# Patient Record
Sex: FEMALE | Race: WHITE | NOT HISPANIC OR LATINO | Employment: FULL TIME | URBAN - METROPOLITAN AREA
[De-identification: names, ages, dates, MRNs, and addresses within clinical notes are randomized per-mention and may not be internally consistent; named-entity substitution may affect disease eponyms.]

---

## 2023-07-31 ENCOUNTER — APPOINTMENT (OUTPATIENT)
Dept: PRIMARY CARE | Facility: CLINIC | Age: 63
End: 2023-07-31
Payer: COMMERCIAL

## 2023-07-31 ENCOUNTER — OFFICE VISIT (OUTPATIENT)
Dept: PRIMARY CARE | Facility: CLINIC | Age: 63
End: 2023-07-31
Payer: COMMERCIAL

## 2023-07-31 VITALS
TEMPERATURE: 97.2 F | HEIGHT: 62 IN | BODY MASS INDEX: 23.92 KG/M2 | DIASTOLIC BLOOD PRESSURE: 74 MMHG | OXYGEN SATURATION: 98 % | WEIGHT: 130 LBS | SYSTOLIC BLOOD PRESSURE: 122 MMHG | HEART RATE: 58 BPM

## 2023-07-31 DIAGNOSIS — Z23 ENCOUNTER FOR IMMUNIZATION: ICD-10-CM

## 2023-07-31 DIAGNOSIS — M85.88 OSTEOPENIA OF LUMBAR SPINE: ICD-10-CM

## 2023-07-31 DIAGNOSIS — Z12.39 BREAST SCREENING: ICD-10-CM

## 2023-07-31 DIAGNOSIS — E78.00 ELEVATED LDL CHOLESTEROL LEVEL: ICD-10-CM

## 2023-07-31 DIAGNOSIS — Z00.00 ROUTINE GENERAL MEDICAL EXAMINATION AT A HEALTH CARE FACILITY: Primary | ICD-10-CM

## 2023-07-31 DIAGNOSIS — R73.09 ABNORMAL GLUCOSE: ICD-10-CM

## 2023-07-31 PROBLEM — Z11.52 ENCOUNTER FOR SCREENING LABORATORY TESTING FOR COVID-19 VIRUS: Status: RESOLVED | Noted: 2023-07-31 | Resolved: 2023-07-31

## 2023-07-31 PROBLEM — R30.0 DYSURIA: Status: RESOLVED | Noted: 2023-07-31 | Resolved: 2023-07-31

## 2023-07-31 LAB
ALANINE AMINOTRANSFERASE (SGPT) (U/L) IN SER/PLAS: 12 U/L (ref 7–45)
ALBUMIN (G/DL) IN SER/PLAS: 4.5 G/DL (ref 3.4–5)
ALKALINE PHOSPHATASE (U/L) IN SER/PLAS: 72 U/L (ref 33–136)
ANION GAP IN SER/PLAS: 13 MMOL/L (ref 10–20)
ASPARTATE AMINOTRANSFERASE (SGOT) (U/L) IN SER/PLAS: 16 U/L (ref 9–39)
BILIRUBIN TOTAL (MG/DL) IN SER/PLAS: 0.7 MG/DL (ref 0–1.2)
CALCIDIOL (25 OH VITAMIN D3) (NG/ML) IN SER/PLAS: 31 NG/ML
CALCIUM (MG/DL) IN SER/PLAS: 9.3 MG/DL (ref 8.6–10.6)
CARBON DIOXIDE, TOTAL (MMOL/L) IN SER/PLAS: 24 MMOL/L (ref 21–32)
CHLORIDE (MMOL/L) IN SER/PLAS: 104 MMOL/L (ref 98–107)
CHOLESTEROL (MG/DL) IN SER/PLAS: 259 MG/DL (ref 0–199)
CHOLESTEROL IN HDL (MG/DL) IN SER/PLAS: 91.6 MG/DL
CHOLESTEROL/HDL RATIO: 2.8
CREATININE (MG/DL) IN SER/PLAS: 1.04 MG/DL (ref 0.5–1.05)
GFR FEMALE: 60 ML/MIN/1.73M2
GLUCOSE (MG/DL) IN SER/PLAS: 102 MG/DL (ref 74–99)
LDL: 155 MG/DL (ref 0–99)
POTASSIUM (MMOL/L) IN SER/PLAS: 4.4 MMOL/L (ref 3.5–5.3)
PROTEIN TOTAL: 7.2 G/DL (ref 6.4–8.2)
SODIUM (MMOL/L) IN SER/PLAS: 137 MMOL/L (ref 136–145)
TRIGLYCERIDE (MG/DL) IN SER/PLAS: 60 MG/DL (ref 0–149)
UREA NITROGEN (MG/DL) IN SER/PLAS: 21 MG/DL (ref 6–23)
VLDL: 12 MG/DL (ref 0–40)

## 2023-07-31 PROCEDURE — 90471 IMMUNIZATION ADMIN: CPT | Performed by: NURSE PRACTITIONER

## 2023-07-31 PROCEDURE — 99396 PREV VISIT EST AGE 40-64: CPT | Performed by: NURSE PRACTITIONER

## 2023-07-31 PROCEDURE — 90750 HZV VACC RECOMBINANT IM: CPT | Performed by: NURSE PRACTITIONER

## 2023-07-31 PROCEDURE — 80053 COMPREHEN METABOLIC PANEL: CPT

## 2023-07-31 PROCEDURE — 82306 VITAMIN D 25 HYDROXY: CPT

## 2023-07-31 PROCEDURE — 80061 LIPID PANEL: CPT

## 2023-07-31 PROCEDURE — 1036F TOBACCO NON-USER: CPT | Performed by: NURSE PRACTITIONER

## 2023-07-31 PROCEDURE — 3008F BODY MASS INDEX DOCD: CPT | Performed by: NURSE PRACTITIONER

## 2023-07-31 ASSESSMENT — PAIN SCALES - GENERAL: PAINLEVEL: 0-NO PAIN

## 2023-07-31 ASSESSMENT — ENCOUNTER SYMPTOMS
FATIGUE: 0
BLOOD IN STOOL: 0
ARTHRALGIAS: 0
PALPITATIONS: 0
HEADACHES: 0
CONSTIPATION: 0
VOMITING: 0
SHORTNESS OF BREATH: 0
NAUSEA: 0
FREQUENCY: 0
COUGH: 0
ABDOMINAL PAIN: 0
BACK PAIN: 0
APPETITE CHANGE: 0
MYALGIAS: 0
UNEXPECTED WEIGHT CHANGE: 0
DIARRHEA: 0
DYSURIA: 0
NERVOUS/ANXIOUS: 0

## 2023-07-31 ASSESSMENT — PATIENT HEALTH QUESTIONNAIRE - PHQ9
SUM OF ALL RESPONSES TO PHQ9 QUESTIONS 1 AND 2: 0
1. LITTLE INTEREST OR PLEASURE IN DOING THINGS: NOT AT ALL
2. FEELING DOWN, DEPRESSED OR HOPELESS: NOT AT ALL

## 2023-07-31 NOTE — PROGRESS NOTES
Subjective   Gillian Bhatt is a 63 y.o. female and is here for a comprehensive physical exam. The patient reports no problems.    Has previously been under GYN (Dr. Shirlene Blackburn), has since last visit closed practice.      History:  LMP: No LMP recorded.  Menopause at  years  Last pap date: ?3/2022 with Dr. Shirlene Blackburn  Abnormal pap? no  : 4  Para: 2  Mammogram: 3/24/2022 normal  DEXA: 3/24/2022 Osteopenia    Colonoscopy: 2022 - sessile serrated adenoma - Repeat in one year  Scheduled for October     Tdap 2017  Shingrix never    Do you have pain that bothers you in your daily life? no    Review of Systems   Constitutional:  Negative for appetite change, fatigue and unexpected weight change.   HENT:  Positive for hearing loss (hearing loss on occasion with soft noises). Negative for congestion and ear pain.    Eyes:  Negative for visual disturbance.   Respiratory:  Negative for cough and shortness of breath.    Cardiovascular:  Negative for chest pain, palpitations and leg swelling.   Gastrointestinal:  Negative for abdominal pain, blood in stool, constipation, diarrhea, nausea and vomiting.   Genitourinary:  Negative for dysuria, frequency and urgency.   Musculoskeletal:  Negative for arthralgias, back pain and myalgias.   Skin:  Negative for rash.   Neurological:  Negative for syncope and headaches.   Psychiatric/Behavioral:  The patient is not nervous/anxious.         Objective   Physical Exam  Vitals and nursing note reviewed.   Constitutional:       General: She is not in acute distress.     Appearance: Normal appearance. She is normal weight. She is not toxic-appearing.   HENT:      Head: Normocephalic.      Right Ear: Tympanic membrane, ear canal and external ear normal.      Left Ear: Tympanic membrane and external ear normal.      Nose: Nose normal. No congestion.      Mouth/Throat:      Mouth: Mucous membranes are dry.      Pharynx: Oropharynx is clear.   Eyes:      Conjunctiva/sclera:  Conjunctivae normal.   Neck:      Thyroid: No thyromegaly.   Cardiovascular:      Rate and Rhythm: Normal rate and regular rhythm.      Pulses: Normal pulses.      Heart sounds: Normal heart sounds. No murmur heard.  Pulmonary:      Effort: Pulmonary effort is normal. No respiratory distress.      Breath sounds: Normal breath sounds.   Abdominal:      General: Bowel sounds are normal.      Palpations: Abdomen is soft.      Tenderness: There is no abdominal tenderness.   Musculoskeletal:         General: Normal range of motion.      Cervical back: Neck supple.      Right lower leg: No edema.      Left lower leg: No edema.   Lymphadenopathy:      Cervical: No cervical adenopathy.   Skin:     General: Skin is warm and dry.      Capillary Refill: Capillary refill takes less than 2 seconds.      Findings: No rash.   Neurological:      General: No focal deficit present.      Gait: Gait normal.   Psychiatric:         Mood and Affect: Mood normal.         Judgment: Judgment normal.         Assessment/Plan   Healthy female exam.      1. Fasting blood work today.   Requests records for last PAP  2. Patient Counseling:  --Nutrition: Stressed importance of moderation in sodium/caffeine intake, saturated fat and cholesterol, caloric balance, sufficient intake of fresh fruits, vegetables, fiber, calcium, iron, and 1 mg of folate supplement per day (for females capable of pregnancy).  --Discussed the issue of estrogen replacement, calcium supplement, and the daily use of baby aspirin.  --Exercise: Stressed the importance of regular exercise.   --Substance Abuse: Discussed cessation/primary prevention of tobacco, alcohol, or other drug use; driving or other dangerous activities under the influence; availability of treatment for abuse.    --Sexuality: Discussed sexually transmitted diseases, partner selection, use of condoms, avoidance of unintended pregnancy  and contraceptive alternatives.   --Injury prevention: Discussed safety  belts, safety helmets, smoke detector, smoking near bedding or upholstery.   --Dental health: Discussed importance of regular tooth brushing, flossing, and dental visits.  --Immunizations reviewed.  --Discussed benefits of screening colonoscopy.  --After hours service discussed with patient  3. Discussed the patient's BMI with her.  The BMI is in the acceptable range.  4. Follow up next physical in 1 year

## 2023-07-31 NOTE — PATIENT INSTRUCTIONS
Fasting blood work today.    Shingrix #1 today. Due for booster in 2-6 months.    Colonoscopy scheduled for October.    Osteopenia: Continue weight bearing exercise. Plan for repeat DEXA in 2025 for monitoring.    Will get records for last PAP and plan for repeat upon receipt of last results.    Schedule screening mammogram.    Follow up in 1 year and as needed.

## 2023-09-06 PROBLEM — R73.09 ABNORMAL GLUCOSE: Status: ACTIVE | Noted: 2023-09-06

## 2023-09-06 PROBLEM — K63.5 COLON POLYPS: Status: ACTIVE | Noted: 2023-09-06

## 2023-09-06 PROBLEM — E78.00 ELEVATED LDL CHOLESTEROL LEVEL: Status: ACTIVE | Noted: 2023-09-06

## 2023-10-11 ENCOUNTER — APPOINTMENT (OUTPATIENT)
Dept: GASTROENTEROLOGY | Facility: EXTERNAL LOCATION | Age: 63
End: 2023-10-11
Payer: COMMERCIAL

## 2023-10-11 DIAGNOSIS — Z12.11 ENCOUNTER FOR SCREENING FOR MALIGNANT NEOPLASM OF COLON: ICD-10-CM

## 2023-11-06 DIAGNOSIS — Z12.11 SPECIAL SCREENING FOR MALIGNANT NEOPLASMS, COLON: ICD-10-CM

## 2023-11-06 RX ORDER — POLYETHYLENE GLYCOL 3350, SODIUM SULFATE ANHYDROUS, SODIUM BICARBONATE, SODIUM CHLORIDE, POTASSIUM CHLORIDE 236; 22.74; 6.74; 5.86; 2.97 G/4L; G/4L; G/4L; G/4L; G/4L
POWDER, FOR SOLUTION ORAL
Qty: 4000 ML | Refills: 0 | Status: SHIPPED | OUTPATIENT
Start: 2023-11-06

## 2023-11-07 DIAGNOSIS — Z12.11 SCREEN FOR COLON CANCER: Primary | ICD-10-CM

## 2023-11-07 DIAGNOSIS — Z12.11 SPECIAL SCREENING FOR MALIGNANT NEOPLASMS, COLON: ICD-10-CM

## 2023-11-07 RX ORDER — SOD SULF/POT CHLORIDE/MAG SULF 1.479 G
TABLET ORAL
Qty: 24 TABLET | Refills: 0 | OUTPATIENT
Start: 2023-11-07

## 2023-11-07 RX ORDER — SOD SULF/POT CHLORIDE/MAG SULF 1.479 G
12 TABLET ORAL DAILY
Qty: 24 TABLET | Refills: 0 | Status: SHIPPED | OUTPATIENT
Start: 2023-11-07 | End: 2023-11-09

## 2023-11-08 ENCOUNTER — OFFICE VISIT (OUTPATIENT)
Dept: GASTROENTEROLOGY | Facility: EXTERNAL LOCATION | Age: 63
End: 2023-11-08
Payer: COMMERCIAL

## 2023-11-08 ENCOUNTER — LAB REQUISITION (OUTPATIENT)
Dept: LAB | Facility: HOSPITAL | Age: 63
End: 2023-11-08

## 2023-11-08 DIAGNOSIS — D12.2 BENIGN NEOPLASM OF ASCENDING COLON: ICD-10-CM

## 2023-11-08 DIAGNOSIS — D12.5 BENIGN NEOPLASM OF SIGMOID COLON: ICD-10-CM

## 2023-11-08 DIAGNOSIS — D12.0 BENIGN NEOPLASM OF CECUM: ICD-10-CM

## 2023-11-08 DIAGNOSIS — D12.4 BENIGN NEOPLASM OF DESCENDING COLON: ICD-10-CM

## 2023-11-08 DIAGNOSIS — Z12.11 ENCOUNTER FOR SCREENING FOR MALIGNANT NEOPLASM OF COLON: Primary | ICD-10-CM

## 2023-11-08 DIAGNOSIS — Z86.010 PERSONAL HISTORY OF COLONIC POLYPS: ICD-10-CM

## 2023-11-08 PROCEDURE — 88305 TISSUE EXAM BY PATHOLOGIST: CPT

## 2023-11-08 PROCEDURE — 1036F TOBACCO NON-USER: CPT | Performed by: INTERNAL MEDICINE

## 2023-11-08 PROCEDURE — 45385 COLONOSCOPY W/LESION REMOVAL: CPT | Performed by: INTERNAL MEDICINE

## 2023-11-08 PROCEDURE — 3008F BODY MASS INDEX DOCD: CPT | Performed by: INTERNAL MEDICINE

## 2023-11-24 LAB
LABORATORY COMMENT REPORT: NORMAL
PATH REPORT.FINAL DX SPEC: NORMAL
PATH REPORT.GROSS SPEC: NORMAL
PATH REPORT.RELEVANT HX SPEC: NORMAL
PATH REPORT.TOTAL CANCER: NORMAL

## 2024-02-06 ENCOUNTER — APPOINTMENT (OUTPATIENT)
Dept: PRIMARY CARE | Facility: CLINIC | Age: 64
End: 2024-02-06
Payer: COMMERCIAL

## 2024-02-21 ENCOUNTER — APPOINTMENT (OUTPATIENT)
Dept: RADIOLOGY | Facility: CLINIC | Age: 64
End: 2024-02-21
Payer: COMMERCIAL

## 2024-03-07 ENCOUNTER — HOSPITAL ENCOUNTER (OUTPATIENT)
Dept: RADIOLOGY | Facility: CLINIC | Age: 64
Discharge: HOME | End: 2024-03-07
Payer: COMMERCIAL

## 2024-03-07 DIAGNOSIS — Z12.39 BREAST SCREENING: ICD-10-CM

## 2024-03-07 PROCEDURE — 77067 SCR MAMMO BI INCL CAD: CPT

## 2024-03-07 PROCEDURE — 77067 SCR MAMMO BI INCL CAD: CPT | Mod: BILATERAL PROCEDURE | Performed by: RADIOLOGY

## 2024-03-07 PROCEDURE — 77063 BREAST TOMOSYNTHESIS BI: CPT | Mod: BILATERAL PROCEDURE | Performed by: RADIOLOGY

## 2024-03-08 ENCOUNTER — APPOINTMENT (OUTPATIENT)
Dept: RADIOLOGY | Facility: CLINIC | Age: 64
End: 2024-03-08
Payer: COMMERCIAL

## 2024-05-28 ENCOUNTER — TELEPHONE (OUTPATIENT)
Dept: PRIMARY CARE | Facility: CLINIC | Age: 64
End: 2024-05-28
Payer: COMMERCIAL

## 2024-05-31 ENCOUNTER — OFFICE VISIT (OUTPATIENT)
Dept: PRIMARY CARE | Facility: CLINIC | Age: 64
End: 2024-05-31
Payer: COMMERCIAL

## 2024-05-31 VITALS
HEIGHT: 62 IN | HEART RATE: 61 BPM | WEIGHT: 132 LBS | TEMPERATURE: 97.5 F | SYSTOLIC BLOOD PRESSURE: 112 MMHG | DIASTOLIC BLOOD PRESSURE: 78 MMHG | OXYGEN SATURATION: 97 % | BODY MASS INDEX: 24.29 KG/M2

## 2024-05-31 DIAGNOSIS — K21.9 GASTROESOPHAGEAL REFLUX DISEASE WITHOUT ESOPHAGITIS: Primary | ICD-10-CM

## 2024-05-31 PROCEDURE — 99213 OFFICE O/P EST LOW 20 MIN: CPT | Performed by: NURSE PRACTITIONER

## 2024-05-31 PROCEDURE — 3008F BODY MASS INDEX DOCD: CPT | Performed by: NURSE PRACTITIONER

## 2024-05-31 RX ORDER — OMEPRAZOLE 40 MG/1
40 CAPSULE, DELAYED RELEASE ORAL
Qty: 30 CAPSULE | Refills: 0 | Status: SHIPPED | OUTPATIENT
Start: 2024-05-31 | End: 2024-06-25 | Stop reason: SDUPTHER

## 2024-05-31 RX ORDER — OMEPRAZOLE 40 MG/1
40 CAPSULE, DELAYED RELEASE ORAL
Qty: 30 CAPSULE | Refills: 0 | Status: SHIPPED | OUTPATIENT
Start: 2024-05-31 | End: 2024-05-31 | Stop reason: SDUPTHER

## 2024-05-31 ASSESSMENT — ENCOUNTER SYMPTOMS
DEPRESSION: 0
LOSS OF SENSATION IN FEET: 0
OCCASIONAL FEELINGS OF UNSTEADINESS: 0

## 2024-05-31 ASSESSMENT — PAIN SCALES - GENERAL: PAINLEVEL: 0-NO PAIN

## 2024-05-31 NOTE — PATIENT INSTRUCTIONS
GERD/Acid reflux:  The pathophysiology of reflux is discussed.    Anti-reflux measures such as raising the head of the bed, avoiding tight clothing or belts, avoiding eating less than 2 hours before bedtime and do not lie down for 2-4 hours after eating.  Achieve weight loss  Avoid Aspirin, NSAID's, caffeine, spicy food (including garlic and onions), acidic foods (tomatoes, tomato sauces, citrus fruit), fatty or fried foods, peppermints, caffeine (coffee, tea, chocolate, soda), alcohol and tobacco.  OTC H2 blockers (Famotidine or Pepcid) and/or antacids (Tums, Rolaids) are often very helpful for as needed use.   However, for persisting chronic or daily symptoms, prescription strength H2 blockers or a trial of PPI's (Nexium, Prilosec, etc) are often used.   You should alert me if there are persistent symptoms, dysphagia, weight loss or GI bleeding.

## 2024-05-31 NOTE — PROGRESS NOTES
"Subjective   Patient ID: Gillian Bhatt is a 64 y.o. female who presents for Throat Pain    Presents for sensation of something in throat.  Feels like something stuck in throat  Dad has had something in throat that caused coughing attacks - hiatal hernia  Has always thought has same issue    Wakes in AM with globus sensation  Feels like unable to project voice during the day  Denies any congestion, post nasal drainage  Just got back from [facility] - coughing entire time there  No change in diet  Is eating large meals  Is within 4 hours of eating  Jet lag - had been eating large meals while in [place]  Eating late larger meals  Laying down after  Denies any illness, sob  Is able to exercise  Has tried eating slower - coughing seems to be triggered by eating  Sometimes saliva triggers  Dad had a procedure for hiatal hernia     Seen in past for symptoms, told related to deviated septum        Review of Systems    Objective     /78 (BP Location: Right arm, Patient Position: Sitting, BP Cuff Size: Small adult)   Pulse 61   Temp 36.4 °C (97.5 °F) (Temporal)   Ht 1.575 m (5' 2\")   Wt 59.9 kg (132 lb)   SpO2 97%   BMI 24.14 kg/m²      Physical Exam  Vitals and nursing note reviewed.   Constitutional:       General: She is not in acute distress.     Appearance: Normal appearance. She is normal weight.   Cardiovascular:      Rate and Rhythm: Normal rate and regular rhythm.      Heart sounds: Normal heart sounds. No murmur heard.  Pulmonary:      Effort: Pulmonary effort is normal. No respiratory distress.      Breath sounds: Normal breath sounds.   Abdominal:      General: Bowel sounds are normal.      Palpations: Abdomen is soft.      Tenderness: There is abdominal tenderness in the epigastric area.   Lymphadenopathy:      Cervical: No cervical adenopathy.         Assessment/Plan   Diagnoses and all orders for this visit:  Gastroesophageal reflux disease without esophagitis      Patient Instructions   GERD/Acid " reflux:  The pathophysiology of reflux is discussed.    Anti-reflux measures such as raising the head of the bed, avoiding tight clothing or belts, avoiding eating less than 2 hours before bedtime and do not lie down for 2-4 hours after eating.  Achieve weight loss  Avoid Aspirin, NSAID's, caffeine, spicy food (including garlic and onions), acidic foods (tomatoes, tomato sauces, citrus fruit), fatty or fried foods, peppermints, caffeine (coffee, tea, chocolate, soda), alcohol and tobacco.  OTC H2 blockers (Famotidine or Pepcid) and/or antacids (Tums, Rolaids) are often very helpful for as needed use.   However, for persisting chronic or daily symptoms, prescription strength H2 blockers or a trial of PPI's (Nexium, Prilosec, etc) are often used.   You should alert me if there are persistent symptoms, dysphagia, weight loss or GI bleeding.

## 2024-06-25 DIAGNOSIS — K21.9 GASTROESOPHAGEAL REFLUX DISEASE WITHOUT ESOPHAGITIS: ICD-10-CM

## 2024-06-26 RX ORDER — OMEPRAZOLE 40 MG/1
40 CAPSULE, DELAYED RELEASE ORAL
Qty: 30 CAPSULE | Refills: 0 | Status: SHIPPED | OUTPATIENT
Start: 2024-06-26 | End: 2024-07-26

## 2024-07-11 DIAGNOSIS — K21.9 GASTROESOPHAGEAL REFLUX DISEASE WITHOUT ESOPHAGITIS: ICD-10-CM

## 2024-07-11 RX ORDER — OMEPRAZOLE 40 MG/1
40 CAPSULE, DELAYED RELEASE ORAL
Qty: 90 CAPSULE | Refills: 0 | Status: SHIPPED | OUTPATIENT
Start: 2024-07-11 | End: 2024-10-09

## 2024-08-01 ENCOUNTER — APPOINTMENT (OUTPATIENT)
Dept: PRIMARY CARE | Facility: CLINIC | Age: 64
End: 2024-08-01
Payer: COMMERCIAL

## 2024-08-01 VITALS
OXYGEN SATURATION: 98 % | BODY MASS INDEX: 24.2 KG/M2 | TEMPERATURE: 97.3 F | SYSTOLIC BLOOD PRESSURE: 120 MMHG | HEART RATE: 58 BPM | HEIGHT: 61 IN | WEIGHT: 128.2 LBS | DIASTOLIC BLOOD PRESSURE: 80 MMHG

## 2024-08-01 DIAGNOSIS — Z00.00 ROUTINE GENERAL MEDICAL EXAMINATION AT A HEALTH CARE FACILITY: Primary | ICD-10-CM

## 2024-08-01 PROCEDURE — 3008F BODY MASS INDEX DOCD: CPT | Performed by: NURSE PRACTITIONER

## 2024-08-01 PROCEDURE — 99396 PREV VISIT EST AGE 40-64: CPT | Performed by: NURSE PRACTITIONER

## 2024-08-01 ASSESSMENT — ENCOUNTER SYMPTOMS
BACK PAIN: 0
DEPRESSION: 0
NERVOUS/ANXIOUS: 0
ARTHRALGIAS: 0
COUGH: 0
OCCASIONAL FEELINGS OF UNSTEADINESS: 0
FATIGUE: 0
HEADACHES: 0
APPETITE CHANGE: 0
CONSTIPATION: 0
VOMITING: 0
SHORTNESS OF BREATH: 0
UNEXPECTED WEIGHT CHANGE: 0
FREQUENCY: 0
DYSURIA: 0
MYALGIAS: 0
BLOOD IN STOOL: 0
LOSS OF SENSATION IN FEET: 0
ABDOMINAL PAIN: 0
NAUSEA: 0
PALPITATIONS: 0
DIARRHEA: 0

## 2024-08-01 ASSESSMENT — PAIN SCALES - GENERAL: PAINLEVEL: 0-NO PAIN

## 2024-08-01 ASSESSMENT — PATIENT HEALTH QUESTIONNAIRE - PHQ9
SUM OF ALL RESPONSES TO PHQ9 QUESTIONS 1 AND 2: 0
2. FEELING DOWN, DEPRESSED OR HOPELESS: NOT AT ALL
1. LITTLE INTEREST OR PLEASURE IN DOING THINGS: NOT AT ALL

## 2024-08-01 NOTE — PROGRESS NOTES
Subjective   Gillian Bhatt is a 64 y.o. female and is here for a comprehensive physical exam. The patient reports no problems.      History:  LMP: No LMP recorded. Patient is postmenopausal.  Menopause at post years  Last pap date: 2022  Abnormal pap? no  Mammogram: 3/2024  DEXA: 3/2022 normal    Colonoscopy: 11/2023 - repeat one year  due to large number of polyps    Do you have pain that bothers you in your daily life? no    Review of Systems   Constitutional:  Negative for appetite change, fatigue and unexpected weight change.   HENT:  Negative for congestion, ear pain and hearing loss.    Eyes:  Negative for visual disturbance.   Respiratory:  Negative for cough and shortness of breath.    Cardiovascular:  Negative for chest pain, palpitations and leg swelling.   Gastrointestinal:  Negative for abdominal pain, blood in stool, constipation, diarrhea, nausea and vomiting.   Genitourinary:  Negative for dysuria, frequency and urgency.   Musculoskeletal:  Negative for arthralgias, back pain and myalgias.   Skin:  Negative for rash.   Neurological:  Negative for syncope and headaches.   Psychiatric/Behavioral:  The patient is not nervous/anxious.         Objective   Physical Exam  Vitals and nursing note reviewed.   Constitutional:       General: She is not in acute distress.     Appearance: Normal appearance. She is not toxic-appearing.   HENT:      Head: Normocephalic.      Right Ear: Tympanic membrane, ear canal and external ear normal.      Left Ear: Tympanic membrane, ear canal and external ear normal.      Nose: Nose normal.      Mouth/Throat:      Mouth: Mucous membranes are moist.      Pharynx: Oropharynx is clear.   Eyes:      Conjunctiva/sclera: Conjunctivae normal.   Neck:      Thyroid: No thyromegaly.   Cardiovascular:      Rate and Rhythm: Normal rate and regular rhythm.      Pulses: Normal pulses.      Heart sounds: Normal heart sounds. No murmur heard.  Pulmonary:      Effort: Pulmonary effort is  normal. No respiratory distress.      Breath sounds: Normal breath sounds.   Abdominal:      General: Bowel sounds are normal.      Palpations: Abdomen is soft.      Tenderness: There is no abdominal tenderness.   Musculoskeletal:         General: Normal range of motion.      Cervical back: Neck supple.   Lymphadenopathy:      Cervical: No cervical adenopathy.   Skin:     General: Skin is warm and dry.      Capillary Refill: Capillary refill takes less than 2 seconds.      Findings: No rash.   Neurological:      General: No focal deficit present.      Gait: Gait normal.   Psychiatric:         Mood and Affect: Mood normal.         Judgment: Judgment normal.         Assessment/Plan   Healthy female exam.     1.   2. Patient Counseling:  --Nutrition: Stressed importance of moderation in sodium/caffeine intake, saturated fat and cholesterol, caloric balance, sufficient intake of fresh fruits, vegetables, fiber, calcium, iron, and 1 mg of folate supplement per day (for females capable of pregnancy).  --Discussed the issue of estrogen replacement, calcium supplement, and the daily use of baby aspirin.  --Exercise: Stressed the importance of regular exercise.   --Substance Abuse: Discussed cessation/primary prevention of tobacco, alcohol, or other drug use; driving or other dangerous activities under the influence; availability of treatment for abuse.    --Sexuality: Discussed sexually transmitted diseases, partner selection, use of condoms, avoidance of unintended pregnancy  and contraceptive alternatives.   --Injury prevention: Discussed safety belts, safety helmets, smoke detector, smoking near bedding or upholstery.   --Dental health: Discussed importance of regular tooth brushing, flossing, and dental visits.  --Immunizations reviewed.  --Discussed benefits of screening colonoscopy.  --After hours service discussed with patient  3. Discussed the patient's BMI with her.  The BMI is in the acceptable range.  4. Follow  up next physical in 1 year    Patient Instructions   It is important that you have yearly check-ups with your healthcare provider to ensure that you stay up to date on your health, screenings, and vaccinations, even if you feel healthy.     Make sure you eat a diet rich in fruits, vegetables, nuts, beans, whole grains, lean meat, eggs, calcium, and good fats (i.e. olive oil, avocado baked fish). AVOID a diet that is high in red meat, trans- and saturated fats, sweetened beverages, and refined grains (i.e. white bread, rice, sweetened cereals).     Stay hydrated with at least 64 ounces of water daily.    Exercise most days per week, for at least 30-45 minutes per session.     Be sure to wear sunscreen of SPF of 15 or higher if you are going to be outside.    Stress management and coping skills are important to manage our stress levels. Some tips include keep in mind that stress isn't a bad thing, talk about your problems, prioritize your responsibilities, focus on the basics, don't put all your eggs in one basket, set aside time for yourself, and keep things in perspective.     Stay up to date with annual eye exams and twice yearly dental exams.    Recommend annual flu vaccination, in addition to age appropriate recommended vaccines.    PAP smear is recommended every 2-3 years for women 21-29 and every 3-5 years for women 30-65.    Annual mammogram is recommended for women 40-75 - Due in March    Colon cancer screening based on age, family history, and other risk factors.  Due November 2024 per GI    Return for fasting blood work    Follow up for annual well check in 1 year.

## 2024-08-01 NOTE — PATIENT INSTRUCTIONS
It is important that you have yearly check-ups with your healthcare provider to ensure that you stay up to date on your health, screenings, and vaccinations, even if you feel healthy.     Make sure you eat a diet rich in fruits, vegetables, nuts, beans, whole grains, lean meat, eggs, calcium, and good fats (i.e. olive oil, avocado baked fish). AVOID a diet that is high in red meat, trans- and saturated fats, sweetened beverages, and refined grains (i.e. white bread, rice, sweetened cereals).     Stay hydrated with at least 64 ounces of water daily.    Exercise most days per week, for at least 30-45 minutes per session.     Be sure to wear sunscreen of SPF of 15 or higher if you are going to be outside.    Stress management and coping skills are important to manage our stress levels. Some tips include keep in mind that stress isn't a bad thing, talk about your problems, prioritize your responsibilities, focus on the basics, don't put all your eggs in one basket, set aside time for yourself, and keep things in perspective.     Stay up to date with annual eye exams and twice yearly dental exams.    Recommend annual flu vaccination, in addition to age appropriate recommended vaccines.    PAP smear is recommended every 2-3 years for women 21-29 and every 3-5 years for women 30-65.    Annual mammogram is recommended for women 40-75 - Due in March    Colon cancer screening based on age, family history, and other risk factors.  Due November 2024 per GI    Return for fasting blood work    Follow up for annual well check in 1 year.

## 2024-09-12 ENCOUNTER — APPOINTMENT (OUTPATIENT)
Dept: PRIMARY CARE | Facility: CLINIC | Age: 64
End: 2024-09-12
Payer: COMMERCIAL

## 2024-09-19 ENCOUNTER — APPOINTMENT (OUTPATIENT)
Dept: PRIMARY CARE | Facility: CLINIC | Age: 64
End: 2024-09-19
Payer: COMMERCIAL

## 2024-09-19 DIAGNOSIS — K63.5 POLYP OF COLON, UNSPECIFIED PART OF COLON, UNSPECIFIED TYPE: ICD-10-CM

## 2024-09-19 RX ORDER — SOD SULF/POT CHLORIDE/MAG SULF 1.479 G
TABLET ORAL
Qty: 24 TABLET | Refills: 0 | Status: SHIPPED | OUTPATIENT
Start: 2024-09-19

## 2024-09-20 ENCOUNTER — CLINICAL SUPPORT (OUTPATIENT)
Dept: PRIMARY CARE | Facility: CLINIC | Age: 64
End: 2024-09-20
Payer: COMMERCIAL

## 2024-09-20 DIAGNOSIS — Z00.00 ROUTINE GENERAL MEDICAL EXAMINATION AT A HEALTH CARE FACILITY: ICD-10-CM

## 2024-09-20 LAB
25(OH)D3 SERPL-MCNC: 25 NG/ML (ref 30–100)
ALBUMIN SERPL BCP-MCNC: 4.2 G/DL (ref 3.4–5)
ALP SERPL-CCNC: 66 U/L (ref 33–136)
ALT SERPL W P-5'-P-CCNC: 10 U/L (ref 7–45)
ANION GAP SERPL CALC-SCNC: 11 MMOL/L (ref 10–20)
AST SERPL W P-5'-P-CCNC: 14 U/L (ref 9–39)
BILIRUB SERPL-MCNC: 0.6 MG/DL (ref 0–1.2)
BUN SERPL-MCNC: 21 MG/DL (ref 6–23)
CALCIUM SERPL-MCNC: 9.1 MG/DL (ref 8.6–10.6)
CHLORIDE SERPL-SCNC: 105 MMOL/L (ref 98–107)
CHOLEST SERPL-MCNC: 247 MG/DL (ref 0–199)
CHOLESTEROL/HDL RATIO: 3.3
CO2 SERPL-SCNC: 26 MMOL/L (ref 21–32)
CREAT SERPL-MCNC: 0.97 MG/DL (ref 0.5–1.05)
EGFRCR SERPLBLD CKD-EPI 2021: 65 ML/MIN/1.73M*2
GLUCOSE SERPL-MCNC: 104 MG/DL (ref 74–99)
HDLC SERPL-MCNC: 74.1 MG/DL
LDLC SERPL CALC-MCNC: 152 MG/DL
NON HDL CHOLESTEROL: 173 MG/DL (ref 0–149)
POTASSIUM SERPL-SCNC: 4.4 MMOL/L (ref 3.5–5.3)
PROT SERPL-MCNC: 6.8 G/DL (ref 6.4–8.2)
SODIUM SERPL-SCNC: 138 MMOL/L (ref 136–145)
TRIGL SERPL-MCNC: 106 MG/DL (ref 0–149)
VLDL: 21 MG/DL (ref 0–40)

## 2024-09-20 PROCEDURE — 90471 IMMUNIZATION ADMIN: CPT | Performed by: NURSE PRACTITIONER

## 2024-09-20 PROCEDURE — 82306 VITAMIN D 25 HYDROXY: CPT | Performed by: NURSE PRACTITIONER

## 2024-09-20 PROCEDURE — 90750 HZV VACC RECOMBINANT IM: CPT | Performed by: NURSE PRACTITIONER

## 2024-09-20 PROCEDURE — 80053 COMPREHEN METABOLIC PANEL: CPT | Performed by: NURSE PRACTITIONER

## 2024-09-20 PROCEDURE — 80061 LIPID PANEL: CPT | Performed by: NURSE PRACTITIONER

## 2024-10-07 ENCOUNTER — TELEPHONE (OUTPATIENT)
Dept: PRIMARY CARE | Facility: CLINIC | Age: 64
End: 2024-10-07
Payer: COMMERCIAL

## 2024-10-07 NOTE — TELEPHONE ENCOUNTER
Patient c/o GERD, Cough, Raspy Voice after stopping Prilosec, wondering if should restart and/if you want her to see ENT or GI

## 2024-10-07 NOTE — TELEPHONE ENCOUNTER
Patient has concerns GERD cough, raspy voice, she stopped Prilosec. Her question is should she restart Prilosec and if she would like referral to ENT specialist       Please advise

## 2024-10-15 NOTE — TELEPHONE ENCOUNTER
Cough and laryngitis persisting, GERD resolved, holding referral and EGD based on if restarting medicine helps.

## 2024-11-04 ENCOUNTER — OFFICE VISIT (OUTPATIENT)
Dept: PRIMARY CARE | Facility: CLINIC | Age: 64
End: 2024-11-04
Payer: COMMERCIAL

## 2024-11-04 VITALS
BODY MASS INDEX: 24.47 KG/M2 | HEART RATE: 68 BPM | WEIGHT: 129.6 LBS | OXYGEN SATURATION: 99 % | TEMPERATURE: 97.9 F | HEIGHT: 61 IN | SYSTOLIC BLOOD PRESSURE: 118 MMHG | DIASTOLIC BLOOD PRESSURE: 80 MMHG

## 2024-11-04 DIAGNOSIS — J04.0 LARYNGITIS, ACUTE: ICD-10-CM

## 2024-11-04 DIAGNOSIS — J02.9 ACUTE PHARYNGITIS, UNSPECIFIED ETIOLOGY: Primary | ICD-10-CM

## 2024-11-04 LAB — POC RAPID STREP: NEGATIVE

## 2024-11-04 PROCEDURE — 3008F BODY MASS INDEX DOCD: CPT | Performed by: NURSE PRACTITIONER

## 2024-11-04 PROCEDURE — 99213 OFFICE O/P EST LOW 20 MIN: CPT | Performed by: NURSE PRACTITIONER

## 2024-11-04 PROCEDURE — 1036F TOBACCO NON-USER: CPT | Performed by: NURSE PRACTITIONER

## 2024-11-04 PROCEDURE — 87880 STREP A ASSAY W/OPTIC: CPT | Performed by: NURSE PRACTITIONER

## 2024-11-04 RX ORDER — OMEPRAZOLE 40 MG/1
CAPSULE, DELAYED RELEASE ORAL
COMMUNITY
Start: 2024-11-02

## 2024-11-04 ASSESSMENT — ENCOUNTER SYMPTOMS: SORE THROAT: 1

## 2024-11-04 NOTE — PROGRESS NOTES
"Subjective   Patient ID: Gillian Bhatt is a 64 y.o. female who presents for Sore Throat (Onset: last Tuesday//Have your symptoms gotten better or worse: comes and goes//Other sx:/Loss of voice/Cough/Headache/Mild body aches).    Presents for sore throat x 2-3 days  Has been sick since last Tuesday with cough, hoarseness  Ongoing cough  Last few days throat feels like \"on fire\" with dysphagia  Covid negative last week  Hoarse voice  Was feeling better Friday and Saturday    Has been using throat lozenges - helps some    Sore Throat       Review of Systems   HENT:  Positive for sore throat.        Objective     /80 (BP Location: Left arm, Patient Position: Sitting, BP Cuff Size: Adult)   Pulse 68   Temp 36.6 °C (97.9 °F) (Temporal)   Ht 1.549 m (5' 1\")   Wt 58.8 kg (129 lb 9.6 oz)   SpO2 99%   BMI 24.49 kg/m²      Physical Exam  Vitals and nursing note reviewed.   Constitutional:       Appearance: She is ill-appearing. She is not toxic-appearing.   HENT:      Head: Normocephalic.      Right Ear: Tympanic membrane, ear canal and external ear normal.      Left Ear: Tympanic membrane, ear canal and external ear normal.      Nose: Nose normal.      Mouth/Throat:      Pharynx: Posterior oropharyngeal erythema present.   Eyes:      Conjunctiva/sclera: Conjunctivae normal.   Cardiovascular:      Rate and Rhythm: Normal rate and regular rhythm.      Heart sounds: Normal heart sounds.   Pulmonary:      Effort: Pulmonary effort is normal. No respiratory distress.      Breath sounds: Normal breath sounds.   Lymphadenopathy:      Cervical: Cervical adenopathy present.          Assessment/Plan   Diagnoses and all orders for this visit:  Acute pharyngitis, unspecified etiology  -     POCT Rapid Strep A manually resulted  Laryngitis, acute      Patient Instructions   Appear viral  Rapid strep in office today  Increase fluids and rest  For hoarseness, encouraged voice rest  Follow up if symptoms not improving with " treatment

## 2024-12-06 ENCOUNTER — APPOINTMENT (OUTPATIENT)
Dept: GASTROENTEROLOGY | Facility: EXTERNAL LOCATION | Age: 64
End: 2024-12-06
Payer: COMMERCIAL

## 2024-12-06 DIAGNOSIS — Z86.0100 HISTORY OF COLONIC POLYPS: ICD-10-CM

## 2024-12-06 DIAGNOSIS — D12.3 BENIGN NEOPLASM OF TRANSVERSE COLON: ICD-10-CM

## 2024-12-06 DIAGNOSIS — D12.5 BENIGN NEOPLASM OF SIGMOID COLON: Primary | ICD-10-CM

## 2024-12-06 DIAGNOSIS — Z86.0101 HISTORY OF ADENOMATOUS POLYP OF COLON: ICD-10-CM

## 2024-12-06 PROCEDURE — 45385 COLONOSCOPY W/LESION REMOVAL: CPT | Performed by: INTERNAL MEDICINE

## 2024-12-06 PROCEDURE — 88305 TISSUE EXAM BY PATHOLOGIST: CPT

## 2024-12-06 PROCEDURE — 88305 TISSUE EXAM BY PATHOLOGIST: CPT | Performed by: PATHOLOGY

## 2024-12-09 ENCOUNTER — LAB REQUISITION (OUTPATIENT)
Dept: LAB | Facility: HOSPITAL | Age: 64
End: 2024-12-09
Payer: COMMERCIAL

## 2025-02-20 ENCOUNTER — TELEMEDICINE CLINICAL SUPPORT (OUTPATIENT)
Dept: GENETICS | Facility: CLINIC | Age: 65
End: 2025-02-20
Payer: COMMERCIAL

## 2025-02-20 DIAGNOSIS — Z13.71 ENCOUNTER FOR NONPROCREATIVE GENETIC COUNSELING AND TESTING: Primary | ICD-10-CM

## 2025-02-20 DIAGNOSIS — Z83.719 FAMILY HX COLONIC POLYPS: ICD-10-CM

## 2025-02-20 DIAGNOSIS — Z86.0100 HISTORY OF COLON POLYPS: ICD-10-CM

## 2025-02-20 DIAGNOSIS — Z80.0 FAMILY HISTORY OF COLON CANCER: ICD-10-CM

## 2025-02-20 DIAGNOSIS — Z71.83 ENCOUNTER FOR NONPROCREATIVE GENETIC COUNSELING AND TESTING: Primary | ICD-10-CM

## 2025-02-20 PROCEDURE — 96041 GENETIC COUNSELING SVC EA 30: CPT | Performed by: GENETIC COUNSELOR, MS

## 2025-02-20 NOTE — PROGRESS NOTES
History of Present Illness:  Gillian Bhatt is a 64 y.o. female with a history of multiple colon polyps, and a family history of colon polyps and colon cancer. She was referred to the Cancer Genetics Clinic at [facility] by Emily Villafuerte MD. Gillian is interested in genetic testing to clarify their personal risk for cancer, as well as the risks to their family members.    Cancer Medical History:  Personal history of cancer? No.  Personal history of colon polyps? Yes.  Summary:  History of multiple colon polyps, per the below. All known pathology is serrated (missing pathology from 2017 and prior colonoscopy). Has repeat colonoscopies every year.    2024 - five 15 to 18 mm polyps in the hepatic flexure, sigmoid colon, and transverse colon, one 20 mm polyp I the sigmoid colon, 6 - all sessile serrated adenomas and/or hyperplastic polyp.  2023 - eight small polyps in the sigmoid colon, all sessile serrated polyps  2022 - four 6 to 14 mm polyps in the ascending colon, one 14 mm polyp in the descending colon, all sessile serrated adenomas.  2017 - one 8 mm polyp, pathology not available for review, colonoscopy notes history of sessile serrated colon polyp on prior colonoscopy.    Prior hereditary cancer (germline) genetic testing? No.    Cancer screening history:  Mammograms? Yes, yearly.  Breast biopsy? No.  PAP smear? Yes, as needed.  Colonoscopy? Yes--see above.   Upper endoscopy? No.  Dermatology? Yes, yearly.  Other cancer screening? No.    Reproductive History:  Number of children: 3.  Number of pregnancies: 5.  Age first birth: 30.  Breast feeding? Yes.  Menarche (age): 13.  Menopause (age): ~55.  OCP: No.  HRT: No.    Hysterectomy? No.  Oophorectomy? No.    Family history:  A 4-generation pedigree was obtained and was significant for the following:    -Patient, colon polyps per the above;  -Sister, history of multiple colon polyps;  -Mother, no history of cancer,  at age 85/86;  -Maternal  uncle, colon cancer at or prior to age 50,  at age 50;  -Father, possible kidney cancer,  at age 90.    Maternal ancestry is [place] [employer]azi [employer]ism.  Paternal ancestry is Polish Ashkenazi [employer]ism. There is no known consanguinity.    Genetic counseling:  Gillian Bhatt is a 64 y.o. female with polyposis and family history of polyposis and colon cancer concerning for possible hereditary polyposis. Given her pathology (serrated), she may have serrated polyposis syndrome, for which a genetic cause cannot usually be found (clinical diagnosis), however given her sister's history of multiple colon polyps and maternal uncle's history of early-onset colon cancer, it is important to rule out other causes of hereditary polyposis/cancer, including MUTYH-associated polyposis (MAP) (possible given her sibling is the only other known family member with reported polyposis), or even GREM1-associated hereditary mixed polyposis syndrome (HMPS, given her Ashkenazi Pentecostalism ancestry). It is also possible that yet a different hereditary polyposis/colon cancer predisposition is in the family. Testing is medically necessary, as it will help determine how often Gillian needs to have colonoscopies, as well as if she may benefit from any other intervention (such as EGD).    We reviewed genes and chromosomes, including the MUTYH gene causing MAP (which is recessive) and the GREM1 gene causing HMPS. We discussed that most cancers are not due to an inherited genetic susceptibility. However, in about 5-10% of families, there is an inherited genetic mutation that can make a person more susceptible to developing certain forms of cancer. With polyposis conditions, we often see individuals with multiple colon polyps, and sometimes colon cancer. Sometimes there is a family history of colon polyps and/or colon cancer, but at other times, a patient may be the first and only person in their family to have developed many colon polyps.    We  discussed that there are multiple genes associated with increased colon/polyp. Some genes are considered highly penetrant genes, meaning a mutation in the gene confers a high risk of cancer or polyps. On the other hand, there are other intermediate (moderate risk) cancer genes. For many of the moderate risk genes, there is sometimes limited information regarding the degree to which a mutation in the gene affects risk of different types of cancers. Additionally, for some of these moderate risk genes, the appropriate management for individuals who have a mutation in one of these genes is not always clear. In many cases, even if an individual tests positive for a mutation in a moderate risk gene, recommendations are still based on the family history, not the positive test result.    Gillian was counseled about hereditary cancer susceptibility including cancer risks, options for increased screening and/or risk reduction, genetic testing, and the implications for other family members. We discussed performing MUTYH & GREM1 genetic testing in the context of a panel test, either the 39-gene [facility] CancerNext panel or the 76-gene [facility] CancerNext-Expanded panel. Gillian expressed the greatest interest in the [employer] CancerNext panel, which examines the following 39 genes:  APC, NORY, AXIN2, BAP1, BARD1, BMPR1A, BRCA1, BRCA2, BRIP1, CDH1, CDKN2A, CHEK2, EPCAM, FH, FLCN, GREM1, HOXB13, MBD4, MET, MLH1, MSH2, MSH3, MSH6, MUTYH, NF1, NTHL1, PALB2, PMS2, POLD1, POLE, PTEN, RAD51C, RAD51D, SMAD4, STK11, TP53, TSC1, TSC2, VHL.    We discussed the Genetic Information Nondiscrimination Act (LUCERO). We discussed the protections and limitations of LUCERO. LUCERO generally makes in illegal for health insurance companies, group health plans, and most employers (with >15 employees) to discriminate based on genetic information. It does not protect against genetic discrimination for life insurance, disability insurance, long-term care, or other  insurances.    After a discussion about the risks, benefits, and limitations of genetic testing, Gillian elected to undergo genetic testing for hereditary cancer via the [employer] CancerNext-Expanded panel test described above. She elected the self-pay ($249) option. Oral consent for testing was obtained. An [facility] DNA/RNA blood test kit will be sent to her home. They will then bring the test kit to a  outpatient location to have their blood drawn for testing (using the test tubes provided in the kit). The sample will then be sent to [facility] for analysis.    Results are typically available in 3 weeks from the time of blood draw. Gillian will return to the Cancer Genetics Clinic to discuss her test results. At that time, we will make recommendations for both Gillian and her family members in terms of cancer screening and/or cancer risk reduction options.    PLAN:  1.  Gillian elected to undergo genetic testing for hereditary cancer via the [employer] CancerNext-Expanded panel test described above. She elected the self-pay ($249) option. Oral consent for testing was obtained. An [facility] DNA/RNA blood test kit will be sent to her home. They will then bring the test kit to a  outpatient location to have their blood drawn for testing (using the test tubes provided in the kit). The sample will then be sent to [facility] for analysis.    2.  Gillian will return to the Cancer Genetics Clinic to discuss test results. A follow up appointment has been scheduled for 3/27/2025, virtual visit.     3. We remain available to Gillian at [phone] if any questions arise regarding information discussed at today's visit. Marie can be reached directly at [phone].    Total time spent on day of encounter: 93 minutes (58 minutes with patient, 35 minutes on pre/post patient care activities, including documentation).     Virtual or Telephone Consent    An interactive audio and video telecommunication system which permits real time  communications between the patient (at the originating site) and provider (at the distant site) was utilized to provide this telehealth service.   Verbal consent was requested and obtained from Gillian Bhatt on this date, 02/20/25 for a telehealth visit.

## 2025-03-01 ENCOUNTER — LAB (OUTPATIENT)
Dept: LAB | Facility: HOSPITAL | Age: 65
End: 2025-03-01
Payer: COMMERCIAL

## 2025-03-01 DIAGNOSIS — Z83.719 FAMILY HISTORY OF COLON POLYPS, UNSPECIFIED: ICD-10-CM

## 2025-03-01 DIAGNOSIS — Z86.0100 PERSONAL HISTORY OF COLON POLYPS, UNSPECIFIED: Primary | ICD-10-CM

## 2025-03-01 DIAGNOSIS — Z80.0 FAMILY HISTORY OF MALIGNANT NEOPLASM OF DIGESTIVE ORGANS: ICD-10-CM

## 2025-03-20 LAB
COMMENTS - MP RESULT TYPE: NORMAL
SCAN RESULT: NORMAL

## 2025-03-27 ENCOUNTER — TELEMEDICINE CLINICAL SUPPORT (OUTPATIENT)
Dept: GENETICS | Facility: CLINIC | Age: 65
End: 2025-03-27
Payer: COMMERCIAL

## 2025-03-27 DIAGNOSIS — Z13.71 ENCOUNTER FOR NONPROCREATIVE GENETIC COUNSELING AND TESTING: Primary | ICD-10-CM

## 2025-03-27 DIAGNOSIS — Z83.719 FAMILY HX COLONIC POLYPS: ICD-10-CM

## 2025-03-27 DIAGNOSIS — Z80.0 FAMILY HISTORY OF COLON CANCER: ICD-10-CM

## 2025-03-27 DIAGNOSIS — D12.6 SERRATED POLYPOSIS SYNDROME: ICD-10-CM

## 2025-03-27 DIAGNOSIS — Z71.83 ENCOUNTER FOR NONPROCREATIVE GENETIC COUNSELING AND TESTING: Primary | ICD-10-CM

## 2025-03-27 DIAGNOSIS — Z86.0100 HISTORY OF COLON POLYPS: ICD-10-CM

## 2025-03-27 PROCEDURE — 96041 GENETIC COUNSELING SVC EA 30: CPT | Performed by: GENETIC COUNSELOR, MS

## 2025-03-27 NOTE — PROGRESS NOTES
History of Present Illness:  Gillian Bhatt is a 64 y.o. female seen in virtual follow-up in the Cancer Genetics Clinic. She was last seen on 2025, at which time she chose to pursue testing for hereditary cancer due to her history of multiple colon polyps, and a family history of colon polyps and colon cancer. Her results returned, and they are NEGATIVE (normal). Per her request, she will be mailed a copy of her results for her records.    Polyp history, as previously noted:  History of multiple colon polyps, per the below. All known pathology is serrated (missing pathology from 2017 and prior colonoscopy). Has repeat colonoscopies every year.     2024 - five 15 to 18 mm polyps in the hepatic flexure, sigmoid colon, and transverse colon, one 20 mm polyp I the sigmoid colon, 6 - all sessile serrated adenomas and/or hyperplastic polyp.  2023 - eight small polyps in the sigmoid colon, all sessile serrated polyps  2022 - four 6 to 14 mm polyps in the ascending colon, one 14 mm polyp in the descending colon, all sessile serrated adenomas.  2017 - one 8 mm polyp, pathology not available for review, colonoscopy notes history of sessile serrated colon polyp on prior colonoscopy.    Family history (as previously noted):     -Patient, multiple serrated colon polyps, per the above;  -Sister, history of multiple colon polyps;  -Mother, no history of cancer,  at age 85/86;  -Maternal uncle, colon cancer at or prior to age 50,  at age 50;  -Father, possible kidney cancer,  at age 90.     Maternal ancestry is [place] Ashkenazi [employer]oroast[employer]ism.  Paternal ancestry is Polish Ashkenazi Zoroastrianism. There is no known consanguinity.    Genetic test results:  Negative 39-gene [facility] [employer] CancerNext panel, report date 3/18/2025. Of note, RNA analysis was not completed due to insufficient sample quality. Only DNA results are included in the final report.    Genes Analyzed (39 total): APC, NORY, BAP1, BARD1, BMPR1A, BRCA1, BRCA2,  BRIP1, CDH1, CDKN2A, CHEK2, FH, FLCN, MET, MLH1, MSH2, MSH6, MUTYH, NF1, NTHL1, PALB2, PMS2, PTEN, RAD51C, RAD51D, SMAD4, STK11, TP53, TSC1, TSC2 and VHL (sequencing and deletion/duplication); AXIN2, HOXB13, MBD4, MSH3, POLD1 and POLE (sequencing only); EPCAM and GREM1 (deletion/duplication only).    Results summarized below, as well as attached to this encounter.    Genetic counseling;  Gillian's results were NEGATIVE, meaning that no disease causing mutations were identified. A genetic cause for her history of colon polyps and history of cancer has not been identified.    One reason Ms. Bhatt underwent genetic testing was to better understand her risk to develop cancers, to help determine if further cancer screenings or other interventions are indicated. Because her results were negative, Gillian does not have an identifiable genetic risk factor that places her at an increased risk to develop cancer. However, she does meet the clinical diagnostic criteria for serrated polyposis syndrome (SPS). SPS is generally a clinical diagnosis.    Based on current NCCN guidelines, to meet a clinical diagnosis of SPS, an individual must meet:  1) >=5 serrated lesions/polyps proximal to the rectum, all being >=5 mm in size, with >=2 being >=10 mm in size OR  2) >20 serrated lesions/polyps of any size distributed throughout the large bowel, with >=5 being proximal to the rectum    Gillian meets SPS clinical diagnostic criteria based on criterion #1. Of note occasionally, more than one affected case of serrated polyposis is seen in a family, so her sister who has a history of colon polyps may need her own evaluation for this.    Surveillance recommendations for individuals with serrated polyposis syndrome (SPS):  -High-quality colonoscopy with polypectomy until all polyps >=5 mm are removed, then colonoscopy every 1 to 3 y depending on number and size of polyps.  -Clearing of all polyps is preferable but not always possible.  Consider  surgical referral if colonoscopic treatment and/or surveillance is inadequate.    Surveillance recommendations for first-degree relatives of those with SPS:  Regarding relatives, the risk of colorectal cancer in first-degree relatives of individuals with SPS is is elevated. Based on NCCN, First-degree relatives of patients with SPS are encouraged to have colonoscopy at the earliest of the following:  -Age 40 y, same age as youngest diagnosis of serrated polyposis if uncomplicated by cancer, or ten years earlier than earliest diagnosis in family with CRC secondary to serrated polyposis  -Following baseline exam, repeat every 5 y if no polyps are found. If proximal serrated polyps or multiple adenomas are found, consider colonoscopy every 1-3 y    For Gillian's first-degree relatives (for her, her siblings & children), this would mean starting colonoscopies from age 40.    Since Gillian's genetic test results were negative/normal, no genetic testing is recommended for family members. A brief family history of Gillian's children's father was obtained. She is not aware that he  has any family history of cancer, so no further genetic testing or additional cancer screenings are recommended for their children.    Follow-up:  Our understanding of genetic contribution to cancer diagnoses is always evolving, so there may be additional testing recommended in the future. Gillian was asked to keep us apprised as to any changes to their personal and/or family history of cancer, and to contact us in 2-3 years to determine if there have been any changes since our discussion today.    Marie Kwok MS, [facility]  Genetic Counselor  [facility]  [phone]    Note: Post visit, a follow-up [employer] message was sent to the patient to apprise her that RNA testing had not been completed as part of her testing, as this was not reviewed at the visit. The RNA portion of your test was unable to be completed due to a quality issue. The  genetic testing lab ([facility] is willing to repeat the test for no charge if the patient is willing to submit a new blood sample. However, since she did not have any uncertain results as part of her testing, I think that repeating the test is unlikely to change her results. Waiting for patient to reply to Delivery Agent message. [facility]    Total time spent on day of encounter: 24 minutes (10 minutes with patient, 14 minutes on pre/post patient care activities, including documentation).     Virtual or Telephone Consent    An interactive audio and video telecommunication system which permits real time communications between the patient (at the originating site) and provider (at the distant site) was utilized to provide this telehealth service.   Verbal consent was requested and obtained from Gillian Bhatt on this date, 03/27/25 for a telehealth visit and the patient's location was confirmed at the time of the visit.

## 2025-05-06 ENCOUNTER — TELEPHONE (OUTPATIENT)
Dept: PRIMARY CARE | Facility: CLINIC | Age: 65
End: 2025-05-06
Payer: COMMERCIAL

## 2025-05-07 DIAGNOSIS — Z13.6 ENCOUNTER FOR SCREENING FOR CARDIOVASCULAR DISORDERS: Primary | ICD-10-CM

## 2025-07-11 ENCOUNTER — HOSPITAL ENCOUNTER (OUTPATIENT)
Dept: RADIOLOGY | Facility: CLINIC | Age: 65
Discharge: HOME | End: 2025-07-11
Payer: COMMERCIAL

## 2025-07-11 DIAGNOSIS — Z13.6 ENCOUNTER FOR SCREENING FOR CARDIOVASCULAR DISORDERS: ICD-10-CM

## 2025-07-11 PROCEDURE — 75571 CT HRT W/O DYE W/CA TEST: CPT
